# Patient Record
Sex: FEMALE | Race: BLACK OR AFRICAN AMERICAN | Employment: FULL TIME | URBAN - METROPOLITAN AREA
[De-identification: names, ages, dates, MRNs, and addresses within clinical notes are randomized per-mention and may not be internally consistent; named-entity substitution may affect disease eponyms.]

---

## 2017-01-05 ENCOUNTER — HOSPITAL ENCOUNTER (OUTPATIENT)
Dept: GENERAL RADIOLOGY | Age: 52
Discharge: HOME | End: 2017-01-05
Payer: COMMERCIAL

## 2017-01-05 DIAGNOSIS — M25.551 RIGHT HIP PAIN: ICD-10-CM

## 2017-01-05 DIAGNOSIS — M25.512 ACUTE PAIN OF LEFT SHOULDER: ICD-10-CM

## 2017-01-05 PROCEDURE — 73030 X-RAY EXAM OF SHOULDER: CPT

## 2017-01-05 PROCEDURE — 73502 X-RAY EXAM HIP UNI 2-3 VIEWS: CPT

## 2017-04-13 ENCOUNTER — APPOINTMENT (OUTPATIENT)
Dept: GENERAL RADIOLOGY | Age: 52
End: 2017-04-13
Attending: EMERGENCY MEDICINE
Payer: COMMERCIAL

## 2017-04-13 ENCOUNTER — HOSPITAL ENCOUNTER (EMERGENCY)
Age: 52
Discharge: HOME | End: 2017-04-13
Attending: EMERGENCY MEDICINE
Payer: COMMERCIAL

## 2017-04-13 VITALS
RESPIRATION RATE: 18 BRPM | DIASTOLIC BLOOD PRESSURE: 76 MMHG | HEIGHT: 65 IN | OXYGEN SATURATION: 99 % | SYSTOLIC BLOOD PRESSURE: 130 MMHG | WEIGHT: 200 LBS | HEART RATE: 74 BPM | TEMPERATURE: 98.2 F | BODY MASS INDEX: 33.32 KG/M2

## 2017-04-13 DIAGNOSIS — S66.911A WRIST STRAIN, RIGHT, INITIAL ENCOUNTER: Primary | ICD-10-CM

## 2017-04-13 DIAGNOSIS — S00.81XA EXCORIATION OF FACE, INITIAL ENCOUNTER: ICD-10-CM

## 2017-04-13 DIAGNOSIS — T74.91XA DOMESTIC VIOLENCE OF ADULT, INITIAL ENCOUNTER: ICD-10-CM

## 2017-04-13 DIAGNOSIS — S00.83XA FACIAL CONTUSION, INITIAL ENCOUNTER: ICD-10-CM

## 2017-04-13 PROCEDURE — 99283 EMERGENCY DEPT VISIT LOW MDM: CPT | Performed by: EMERGENCY MEDICINE

## 2017-04-13 PROCEDURE — L3908 WHO COCK-UP NONMOLDE PRE OTS: HCPCS

## 2017-04-13 PROCEDURE — 75810000053 HC SPLINT APPLICATION: Performed by: EMERGENCY MEDICINE

## 2017-04-13 PROCEDURE — 73110 X-RAY EXAM OF WRIST: CPT

## 2017-04-13 PROCEDURE — 74011250637 HC RX REV CODE- 250/637: Performed by: EMERGENCY MEDICINE

## 2017-04-13 RX ORDER — CEFAZOLIN SODIUM 1 G/3ML
1 INJECTION, POWDER, FOR SOLUTION INTRAMUSCULAR; INTRAVENOUS
Status: DISCONTINUED | OUTPATIENT
Start: 2017-04-13 | End: 2017-04-13

## 2017-04-13 RX ORDER — IBUPROFEN 800 MG/1
800 TABLET ORAL
Status: COMPLETED | OUTPATIENT
Start: 2017-04-13 | End: 2017-04-13

## 2017-04-13 RX ADMIN — IBUPROFEN 800 MG: 800 TABLET ORAL at 21:18

## 2017-04-14 NOTE — ED NOTES
I have reviewed discharge instructions with the patient. The patient verbalized understanding. Pt left ambulatory
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
## 2017-04-14 NOTE — DISCHARGE INSTRUCTIONS
Continue with ibuprofen and Ultram as usual.  WRIST splint for comfort, may remove during bath/shower time,       Wrist Sprain: Care Instructions  Your Care Instructions    Your wrist hurts because you have stretched or torn ligaments, which connect the bones in your wrist.  Wrist sprains usually take from 2 to 10 weeks to heal, but some take longer. Usually, the more pain you have, the more severe your wrist sprain is and the longer it will take to heal. You can heal faster and regain strength in your wrist with good home treatment. Follow-up care is a key part of your treatment and safety. Be sure to make and go to all appointments, and call your doctor if you are having problems. It's also a good idea to know your test results and keep a list of the medicines you take. How can you care for yourself at home? · Prop up your arm on a pillow when you ice it or anytime you sit or lie down for the next 3 days. Try to keep your wrist above the level of your heart. This will help reduce swelling. · Put ice or cold packs on your wrist for 10 to 20 minutes at a time. Try to do this every 1 to 2 hours for the next 3 days (when you are awake) or until the swelling goes down. Put a thin cloth between the ice pack and your skin. · After 2 or 3 days, if your swelling is gone, apply a heating pad set on low or a warm cloth to your wrist. This helps keep your wrist flexible. Some doctors suggest that you go back and forth between hot and cold. · If you have an elastic bandage, keep it on for the next 24 to 36 hours. The bandage should be snug but not so tight that it causes numbness or tingling. To rewrap the wrist, wrap the bandage around the hand a few times, beginning at the fingers. Then wrap it around the hand between the thumb and index finger, ending by circling the wrist several times. · If your doctor gave you a splint or brace, wear it as directed to protect your wrist until it has healed.   · Take pain medicines exactly as directed. ¨ If the doctor gave you a prescription medicine for pain, take it as prescribed. ¨ If you are not taking a prescription pain medicine, ask your doctor if you can take an over-the-counter medicine. · Try not to use your injured wrist and hand. When should you call for help? Call your doctor now or seek immediate medical care if:  · Your hand or fingers are cool or pale or change color. Watch closely for changes in your health, and be sure to contact your doctor if:  · Your pain gets worse. · Your wrist has not improved after 1 week. Where can you learn more? Go to http://nicholasMang?rKarttaylor.info/. Enter G541 in the search box to learn more about \"Wrist Sprain: Care Instructions. \"  Current as of: May 23, 2016  Content Version: 11.2  © 7038-0087 [employer]. Care instructions adapted under license by [employer] (which disclaims liability or warranty for this information). If you have questions about a medical condition or this instruction, always ask your healthcare professional. [place] [address] any warranty or liability for your use of this information. Learning About RICE (Rest, Ice, Compression, and Elevation)  What is RICE? RICE is a way to care for an injury. RICE helps relieve pain and swelling. It may also help with healing and flexibility. RICE stands for:  · Rest and protect the injured or sore area. · Ice or a cold pack used as soon as possible. · Compression, or wrapping the injured or sore area with an elastic bandage. · Elevation (propping up) the injured or sore area. How do you do RICE? You can use RICE for home treatment when you have general aches and pains or after an injury or surgery. Rest  · Do not put weight on the injury for at least 24 to 48 hours. · Use crutches for a badly sprained knee or ankle. · Support a sprained wrist, elbow, or shoulder with a sling.   Ice  · Put ice or a cold pack on the injury right away to reduce pain and swelling. Frozen vegetables will also work as an ice pack. Put a thin cloth between the ice or cold pack and your skin. The cloth protects the injured area from getting too cold. · Use ice for 10 to 15 minutes at a time for the first 48 to 72 hours. Compression  · Use compression for sprains, strains, and surgeries of the arms and legs. · Wrap the injured area with an elastic bandage or compression sleeve to reduce swelling. · Don't wrap it too tightly. If the area below it feels numb, tingles, or feels cool, loosen the wrap. Elevation  · Use elevation for areas of the body that can be propped up, such as arms and legs. · Prop up the injured area on pillows whenever you use ice. Keep it propped up anytime you sit or lie down. · Try to keep the injured area at or above the level of your heart. This will help reduce swelling and bruising. Where can you learn more? Go to http://nicholas-taylor.info/. Enter W468 in the search box to learn more about \"Learning About RICE (Rest, Ice, Compression, and Elevation). \"  Current as of: May 23, 2016  Content Version: 11.2  © 6557-5699 [employer]. Care instructions adapted under license by [employer] (which disclaims liability or warranty for this information). If you have questions about a medical condition or this instruction, always ask your healthcare professional. [place] [address] any warranty or liability for your use of this information.

## 2017-05-09 ENCOUNTER — HOSPITAL ENCOUNTER (OUTPATIENT)
Dept: MRI IMAGING | Age: 52
Discharge: HOME | End: 2017-05-09
Attending: ORTHOPAEDIC SURGERY
Payer: COMMERCIAL

## 2017-05-09 DIAGNOSIS — M25.531 PAIN IN RIGHT WRIST: ICD-10-CM

## 2017-05-09 PROCEDURE — 73221 MRI JOINT UPR EXTREM W/O DYE: CPT

## 2017-06-05 ENCOUNTER — HOSPITAL ENCOUNTER (OUTPATIENT)
Dept: PHYSICAL THERAPY | Age: 52
Discharge: HOME | End: 2017-06-05
Payer: COMMERCIAL

## 2017-06-05 DIAGNOSIS — M25.551 PAIN OF RIGHT HIP JOINT: ICD-10-CM

## 2017-06-05 PROCEDURE — 97165 OT EVAL LOW COMPLEX 30 MIN: CPT
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
## 2017-06-08 ENCOUNTER — HOSPITAL ENCOUNTER (OUTPATIENT)
Dept: PHYSICAL THERAPY | Age: 52
Discharge: HOME | End: 2017-06-08
Payer: COMMERCIAL

## 2017-06-08 PROCEDURE — 97110 THERAPEUTIC EXERCISES: CPT
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
## 2017-06-09 ENCOUNTER — HOSPITAL ENCOUNTER (OUTPATIENT)
Dept: PHYSICAL THERAPY | Age: 52
Discharge: HOME | End: 2017-06-09
Payer: COMMERCIAL

## 2017-06-09 PROCEDURE — 97110 THERAPEUTIC EXERCISES: CPT
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
## 2017-06-12 ENCOUNTER — HOSPITAL ENCOUNTER (OUTPATIENT)
Dept: PHYSICAL THERAPY | Age: 52
Discharge: HOME | End: 2017-06-12
Payer: COMMERCIAL

## 2017-06-12 PROCEDURE — 97110 THERAPEUTIC EXERCISES: CPT
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
## 2017-06-14 ENCOUNTER — HOSPITAL ENCOUNTER (OUTPATIENT)
Dept: PHYSICAL THERAPY | Age: 52
Discharge: HOME | End: 2017-06-14
Payer: COMMERCIAL

## 2017-06-14 PROCEDURE — 97110 THERAPEUTIC EXERCISES: CPT
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
## 2017-06-16 ENCOUNTER — HOSPITAL ENCOUNTER (OUTPATIENT)
Dept: PHYSICAL THERAPY | Age: 52
Discharge: HOME | End: 2017-06-16
Payer: COMMERCIAL

## 2017-06-16 PROCEDURE — 97018 PARAFFIN BATH THERAPY: CPT

## 2017-06-16 PROCEDURE — 97110 THERAPEUTIC EXERCISES: CPT
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
## 2017-06-19 ENCOUNTER — HOSPITAL ENCOUNTER (OUTPATIENT)
Dept: PHYSICAL THERAPY | Age: 52
Discharge: HOME | End: 2017-06-19
Payer: COMMERCIAL

## 2017-06-19 PROCEDURE — 97110 THERAPEUTIC EXERCISES: CPT
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
## 2017-06-23 ENCOUNTER — HOSPITAL ENCOUNTER (OUTPATIENT)
Dept: PHYSICAL THERAPY | Age: 52
Discharge: HOME | End: 2017-06-23
Payer: COMMERCIAL

## 2017-06-23 PROCEDURE — 97110 THERAPEUTIC EXERCISES: CPT
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
## 2017-06-26 ENCOUNTER — HOSPITAL ENCOUNTER (OUTPATIENT)
Dept: PHYSICAL THERAPY | Age: 52
Discharge: HOME | End: 2017-06-26
Payer: COMMERCIAL

## 2017-06-26 PROCEDURE — 97110 THERAPEUTIC EXERCISES: CPT
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
## 2017-06-28 ENCOUNTER — HOSPITAL ENCOUNTER (OUTPATIENT)
Dept: PHYSICAL THERAPY | Age: 52
Discharge: HOME | End: 2017-06-28
Payer: COMMERCIAL

## 2017-06-28 PROCEDURE — 97110 THERAPEUTIC EXERCISES: CPT
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
## 2017-06-30 ENCOUNTER — HOSPITAL ENCOUNTER (OUTPATIENT)
Dept: PHYSICAL THERAPY | Age: 52
Discharge: HOME | End: 2017-06-30
Payer: COMMERCIAL

## 2017-06-30 PROCEDURE — 97110 THERAPEUTIC EXERCISES: CPT
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
## 2017-07-03 ENCOUNTER — HOSPITAL ENCOUNTER (OUTPATIENT)
Dept: PHYSICAL THERAPY | Age: 52
Discharge: HOME | End: 2017-07-03
Payer: COMMERCIAL

## 2017-07-03 PROCEDURE — 97018 PARAFFIN BATH THERAPY: CPT

## 2017-07-03 PROCEDURE — 97110 THERAPEUTIC EXERCISES: CPT
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
## 2017-07-05 ENCOUNTER — HOSPITAL ENCOUNTER (OUTPATIENT)
Dept: PHYSICAL THERAPY | Age: 52
Discharge: HOME | End: 2017-07-05
Payer: COMMERCIAL

## 2017-07-05 PROCEDURE — 97110 THERAPEUTIC EXERCISES: CPT

## 2017-07-05 PROCEDURE — 97018 PARAFFIN BATH THERAPY: CPT

## 2017-07-05 PROCEDURE — 97140 MANUAL THERAPY 1/> REGIONS: CPT
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
## 2017-07-07 ENCOUNTER — HOSPITAL ENCOUNTER (OUTPATIENT)
Dept: PHYSICAL THERAPY | Age: 52
Discharge: HOME | End: 2017-07-07
Payer: COMMERCIAL

## 2017-07-07 PROCEDURE — 97110 THERAPEUTIC EXERCISES: CPT

## 2017-07-07 PROCEDURE — 97140 MANUAL THERAPY 1/> REGIONS: CPT
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
## 2017-08-18 PROBLEM — K22.2 ESOPHAGEAL STRICTURE: Status: ACTIVE | Noted: 2017-08-18

## 2017-08-18 PROBLEM — K62.5 RECTAL BLEEDING: Status: ACTIVE | Noted: 2017-08-18

## 2017-09-05 ENCOUNTER — HOSPITAL ENCOUNTER (OUTPATIENT)
Age: 52
Setting detail: OUTPATIENT SURGERY
Discharge: HOME | End: 2017-09-05
Attending: SURGERY | Admitting: SURGERY
Payer: COMMERCIAL

## 2017-09-05 ENCOUNTER — PATIENT OUTREACH (OUTPATIENT)
Dept: OTHER | Age: 52
End: 2017-09-05

## 2017-09-05 VITALS
HEART RATE: 78 BPM | RESPIRATION RATE: 18 BRPM | SYSTOLIC BLOOD PRESSURE: 143 MMHG | OXYGEN SATURATION: 93 % | WEIGHT: 222 LBS | HEIGHT: 65 IN | DIASTOLIC BLOOD PRESSURE: 79 MMHG | BODY MASS INDEX: 36.99 KG/M2 | TEMPERATURE: 98.4 F

## 2017-09-05 PROCEDURE — 74011250636 HC RX REV CODE- 250/636

## 2017-09-05 PROCEDURE — C1726 CATH, BAL DIL, NON-VASCULAR: HCPCS | Performed by: SURGERY

## 2017-09-05 PROCEDURE — 77030009426 HC FCPS BIOP ENDOSC BSC -B: Performed by: SURGERY

## 2017-09-05 PROCEDURE — 99153 MOD SED SAME PHYS/QHP EA: CPT | Performed by: SURGERY

## 2017-09-05 PROCEDURE — 88305 TISSUE EXAM BY PATHOLOGIST: CPT | Performed by: SURGERY

## 2017-09-05 PROCEDURE — 88312 SPECIAL STAINS GROUP 1: CPT | Performed by: SURGERY

## 2017-09-05 PROCEDURE — G0500 MOD SEDAT ENDO SERVICE >5YRS: HCPCS | Performed by: SURGERY

## 2017-09-05 PROCEDURE — 76040000026: Performed by: SURGERY

## 2017-09-05 PROCEDURE — 74011250636 HC RX REV CODE- 250/636: Performed by: ANESTHESIOLOGY

## 2017-09-05 RX ORDER — FENTANYL CITRATE 50 UG/ML
100 INJECTION, SOLUTION INTRAMUSCULAR; INTRAVENOUS
Status: DISCONTINUED | OUTPATIENT
Start: 2017-09-05 | End: 2017-09-05 | Stop reason: HOSPADM

## 2017-09-05 RX ORDER — SODIUM CHLORIDE, SODIUM LACTATE, POTASSIUM CHLORIDE, CALCIUM CHLORIDE 600; 310; 30; 20 MG/100ML; MG/100ML; MG/100ML; MG/100ML
1000 INJECTION, SOLUTION INTRAVENOUS CONTINUOUS
Status: DISCONTINUED | OUTPATIENT
Start: 2017-09-05 | End: 2017-09-05 | Stop reason: HOSPADM

## 2017-09-05 RX ORDER — SODIUM CHLORIDE 0.9 % (FLUSH) 0.9 %
5-10 SYRINGE (ML) INJECTION AS NEEDED
Status: DISCONTINUED | OUTPATIENT
Start: 2017-09-05 | End: 2017-09-05 | Stop reason: HOSPADM

## 2017-09-05 RX ORDER — MIDAZOLAM HYDROCHLORIDE 1 MG/ML
5 INJECTION, SOLUTION INTRAMUSCULAR; INTRAVENOUS
Status: DISCONTINUED | OUTPATIENT
Start: 2017-09-05 | End: 2017-09-05 | Stop reason: HOSPADM

## 2017-09-05 RX ORDER — SODIUM CHLORIDE 9 MG/ML
25 INJECTION, SOLUTION INTRAVENOUS CONTINUOUS
Status: DISCONTINUED | OUTPATIENT
Start: 2017-09-05 | End: 2017-09-05 | Stop reason: HOSPADM

## 2017-09-05 RX ORDER — SODIUM CHLORIDE 0.9 % (FLUSH) 0.9 %
5-10 SYRINGE (ML) INJECTION EVERY 8 HOURS
Status: DISCONTINUED | OUTPATIENT
Start: 2017-09-05 | End: 2017-09-05 | Stop reason: HOSPADM

## 2017-09-05 RX ADMIN — FENTANYL CITRATE 50 MCG: 50 INJECTION, SOLUTION INTRAMUSCULAR; INTRAVENOUS at 09:56

## 2017-09-05 RX ADMIN — FENTANYL CITRATE 50 MCG: 50 INJECTION, SOLUTION INTRAMUSCULAR; INTRAVENOUS at 09:37

## 2017-09-05 RX ADMIN — MIDAZOLAM HYDROCHLORIDE 1 MG: 1 INJECTION, SOLUTION INTRAMUSCULAR; INTRAVENOUS at 09:47

## 2017-09-05 RX ADMIN — FENTANYL CITRATE 50 MCG: 50 INJECTION, SOLUTION INTRAMUSCULAR; INTRAVENOUS at 09:30

## 2017-09-05 RX ADMIN — MIDAZOLAM HYDROCHLORIDE 1 MG: 1 INJECTION, SOLUTION INTRAMUSCULAR; INTRAVENOUS at 09:25

## 2017-09-05 RX ADMIN — MIDAZOLAM HYDROCHLORIDE 2 MG: 1 INJECTION, SOLUTION INTRAMUSCULAR; INTRAVENOUS at 09:23

## 2017-09-05 RX ADMIN — FENTANYL CITRATE 50 MCG: 50 INJECTION, SOLUTION INTRAMUSCULAR; INTRAVENOUS at 09:48

## 2017-09-05 RX ADMIN — MIDAZOLAM HYDROCHLORIDE 2 MG: 1 INJECTION, SOLUTION INTRAMUSCULAR; INTRAVENOUS at 09:55

## 2017-09-05 RX ADMIN — MIDAZOLAM HYDROCHLORIDE 2 MG: 1 INJECTION, SOLUTION INTRAMUSCULAR; INTRAVENOUS at 09:29

## 2017-09-05 RX ADMIN — FENTANYL CITRATE 50 MCG: 50 INJECTION, SOLUTION INTRAMUSCULAR; INTRAVENOUS at 09:26

## 2017-09-05 RX ADMIN — FENTANYL CITRATE 50 MCG: 50 INJECTION, SOLUTION INTRAMUSCULAR; INTRAVENOUS at 09:23

## 2017-09-05 RX ADMIN — SODIUM CHLORIDE, SODIUM LACTATE, POTASSIUM CHLORIDE, AND CALCIUM CHLORIDE 1000 ML: 600; 310; 30; 20 INJECTION, SOLUTION INTRAVENOUS at 08:29

## 2017-09-05 NOTE — PROCEDURES
[address]   PROCEDURE NOTE       Name:  India Serrato   MR#:  [id number]   :  1965   Account #:  [de-identified]   Date of Adm:  2017       SURGEON: Eusebio Hardy MD    PREOPERATIVE DIAGNOSES   1. Lower gastrointestinal bleeding with history of   diverticulosis. 2. History of dysphagia with esophageal dilatation. POSTOPERATIVE DIAGNOSES   1. Sigmoid diverticulosis with mild hemorrhoids. No evidence of   active bleeding. 2. Distal stomach submucosal lesion. 3. Proximal stomach diffuse polyps. 4. Hiatal hernia, about 5 cm in size, with evidence of   esophageal stricture. PROCEDURE   1. Esophagogastroduodenoscopy with stomach polyp biopsy. 2. Esophagogastroduodenoscopy with esophageal dilatation with   balloon. 3. Colonoscopy. INDICATIONS: This is a 50-year-old female who presented with an   episode of lower GI bleeding. She needed a colonoscopy. She has   a history of a colonoscopy many years ago, and she was told she   had diverticulosis. She also has history of dysphagia. Dysphagia   is coming back and she wants to have an EGD at the same time. The patient understood the risks and benefits, agreed to   proceed. FINDINGS: Her EGD showed duodenitis at the first portion and   then the distal stomach has a polypoid lesion, ,   it looks like a submucosal lesion with umbilicus. I had Dr. David Flores   in the room and he agreed this is probably a benign lesion, but   I cannot rule out neuroendocrine tumor, EUS may be worthwhile to   proceed. Then the scope was further withdrawn in the proximal   stomach and some diffuse polypoid lesions were seen. It looks   like benign lesions, probably consequences of long-term acid   Reflux, the biopsy was performed. Then scope was further   withdrawn. There was, in the lower esophagus, a hiatal hernia,   about 5 cm long was visualized. The diaphragm insertion was   about at 38 cm,  the   Z-line is at about 33 cm from the incisor. Then I passed the   balloon and the lower esophagus GE junction was dilated to 20 mm   4 times. After the balloon removed, I saw some mucosal bleeding   at the lower GE junction. Otherwise, I did not see any   laceration. Then the stomach was emptied and the scope was   withdrawn. Then we turned the patient into position for colonoscopy. Digital rectal exam first was performed, which was normal. Then   the regular colonoscope was used to advance under direct vision   all the way into the cecum. The appendiceal orifice was clearly   identified. Then the scope was withdrawn. The ascending,   transverse, descending colon looked all normal. There was at   least a moderate amount of diverticulosis in the sigmoid colon. Then the scope was withdrawn into the rectum. The J maneuver was   performed. Mild hemorrhoids were identified. Then the scope was   withdrawn. The patient tolerated the procedure well, transferred   to recovery room in stable condition. RECOMMENDATION: The patient will see me in 2-3 weeks to discuss   the findings and the biopsy results from her EGD.         Juwan Maya MD      BY / JESSICA   D:  09/05/2017   10:19   T:  09/05/2017   11:09   Job #:  [id number]

## 2017-09-05 NOTE — H&P
[facility]  [facility]. [address] [place] [address], [address] [place] [address]  [phone]      Patient:  Cary Peralta  : 1965     HPI  Cary Peralta is a 47 y.o. female who is seen for colonoscopy request. She developed rectal bleeding a month ago. It's dark red, mainly on tissue, she denies constipation, her stool color is brown. She is known to have hemorrhoid, she denies rectal pain, but has some burning sensation in the anal area. Her last colonoscopy was 20 years ago due to rectal bleeding, and she was told to have diverticular disease. She also has history of EGD dilation for dysphagia at least twice. The last dilation was about 5 years ago, now she feels the dysphagia is coming back, she eats very carefully. She does have acid reflux for long time, and esophageal stenosis.      She has been suffering from right ear infection recently.              Past Medical History:   Diagnosis Date    Allergic rhinitis due to pollen 13    Asymptomatic varicose veins 13    Congenital tracheoesophageal fistula, esophageal atresia and stenosis 13    Cough 14    Disorder of bone and cartilage, unspecified 14    Disruptions of 24 hour sleep wake cycle, unspecified 14    Duodenitis      Ear problems      Femoral hernia without mention of obstruction or gangrene, recurrent bilateral 13    Generalized hyperhidrosis 13    GERD (gastroesophageal reflux disease) 14    H/O hiatal hernia      H/O seasonal allergies      History of gastric ulcer      History of osteoporosis      Low bone density for age      Other and unspecified hyperlipidemia 13    Vomiting alone 13             Current Outpatient Prescriptions   Medication Sig Dispense Refill    hydrocortisone (ANUSOL-HC) 2.5 % rectal cream Insert  into rectum four (4) times daily. 30 g 0    pantoprazole (PROTONIX) 40 mg tablet Take 1 Tab by mouth daily.  90 Tab 4    fluticasone (FLONASE) 50 mcg/actuation nasal spray          traMADol (ULTRAM) 50 mg tablet Take 1 Tab by mouth every six (6) hours as needed for Pain. Max Daily Amount: 200 mg. Indications: Pain 120 Tab 5    montelukast (SINGULAIR) 10 mg tablet Take 1 Tab by mouth daily. 90 Tab 3    desonide (TRIDESILON) 0.05 % cream Apply  to affected area two (2) times a day. (Patient taking differently: Apply  to affected area two (2) times daily as needed.) 45 g 3            Allergies   Allergen Reactions    Boniva [Ibandronate] Other (comments)       tachycardia            Past Surgical History:   Procedure Laterality Date    HX COLONOSCOPY        HX GI   1998     EGD    HX GYN   1997     tubal ligation    HX OTHER SURGICAL   1998     esopageal dilation            Family History   Problem Relation Age of Onset   [place] Arthritis-osteo Mother      Hypertension Mother      Cancer Father      Hypertension Father      Stroke Father      Breast Cancer Paternal Aunt      High Cholesterol Other             Social History   Substance Use Topics    Smoking status: Never Smoker    Smokeless tobacco: Never Used    Alcohol use No         Review of Systems   A comprehensive review of systems was negative except for that written in the HPI.       Physical Exam       Visit Vitals    /84    Pulse 84    Ht 5' 5\" (1.651 m)    Wt 224 lb (101.6 kg)    SpO2 96%    BMI 37.28 kg/m2         General:                    Alert, oriented, cooperative, awake patient in no acute distress   Skin:                                    Warm, moist with good texture   Eyes:                                   Sclera are clear, extraocular muscles intact  HENT:                                 Normocephalic; oral mucosa moist, nares patent; neck is supple; trachea midline  Respiratory:               Lungs clear to auscultation bilaterally, breathing is non-labored   Chest:                                  Symmetric throughout one respiratory excursion; no supraclavicular lymphadenopathy  CV:                                      Regular rate and rhythm, no appreciable murmurs, rubs, gallops  Abdomen:                  Soft, protuberant but non-distended; bowel sounds are normoactive   Extremities:               No cyanosis, clubbing or edema  Neurological:             No focal signs        Labs: No results found for: CMP, APTT, PTP, INR      Assessment/Plan:   Marleen Carmona is a 47 y.o. female who has signs and symptoms consistent with esophageal stricture and rectal bleeding.  Will do EGD dilation and colonoscopy at the same time.   Baron Bhakta MD

## 2017-09-05 NOTE — DISCHARGE INSTRUCTIONS
Gastrointestinal Esophagogastroduodenoscopy (EGD) - Upper Exam Discharge Instructions    1. Call Dr. Simon Karimi at [phone] for any problems or questions. 2. Contact the doctor's office for follow up appointment as directed. 3. Medication may cause drowsiness for several hours, therefore, do not drive or                  operate machinery for remainder of the day. 4. No alcohol today. 5. Ordinarily, you may resume regular diet and activity after exam unless otherwise              specified by your physician. 6. For mild soreness in your throat you may use Cepacol throat lozenges or warm               salt-water gargles as needed. Gastrointestinal Colonoscopy/Flexible Sigmoidoscopy - Lower Exam Discharge Instructions  1. Call Dr. Simon Karimi at [phone] for any problems or questions. 2. Contact the doctors office for follow up appointment as directed  3. Medication may cause drowsiness for several hours, therefore, do not drive or operate machinery for remainder of the day. 4. No alcohol today. 5. Ordinarily, you may resume regular diet and activity after exam unless otherwise specified by your physician. 6. Because of air put into your colon during exam, you may experience some abdominal distension, relieved by the passage of gas, for several hours. 7. Contact your physician if you have any of the following:  a. Excessive amount of bleeding - large amount when having a bowel movement. Occasional specks of blood with bowel movement would not be unusual.  b. Severe abdominal pain  c. Fever or Chills    Any additional instructions:  Call to schedule an appointment with Dr. Simon Karimi for 2-3 weeks. Instructions given to Jennifer Jonas and jazlyn.   Instructions given by:  Segundo Chase RN

## 2017-09-05 NOTE — PROGRESS NOTES
Patient passing flatus, tolerating po fluids well. Patient escorted to car via wheelchair  by Eliane Castellanos  for discharge home with boyfriend. Discharge instructions reviewed. Dr. Danielle Merrill spoke with pt and her son.

## 2017-09-05 NOTE — IP AVS SNAPSHOT
[facility] 
 
 
 [address] 
[phone] Patient: Clau Celeste MRN: [id number] :1965 You are allergic to the following Allergen Reactions Boniva (Ibandronate) Other (comments)  
 tachycardia Recent Documentation Height Weight BMI OB Status Smoking Status 1.651 m 100.7 kg 36.94 kg/m2 Menopause Never Smoker Emergency Contacts Name Discharge Info Relation Home Work Mobile Joslyn Arias  Parent [1] [phone] [facility] DISCHARGE CAREGIVER [3] Son [22] [phone] About your hospitalization You were admitted on:  2017 You last received care in the:  [facility] You were discharged on:  2017 Unit phone number:  [phone] Why you were hospitalized Your primary diagnosis was:  Not on File Providers Seen During Your Hospitalizations Provider Role Specialty Primary office phone Jean-Pierre Hou MD Attending Provider General Surgery [phone] Your Primary Care Physician (PCP) Primary Care Physician Office Phone Office Fax [address] [facility] [phone] Follow-up Information None Your Appointments 2017  9:45 AM EDT Office Visit with Celestina Haider, [facility] [address] [facility] ENT) [address] [place] [address]  
[phone] Current Discharge Medication List  
  
ASK your doctor about these medications Dose & Instructions Dispensing Information Comments Morning Noon Evening Bedtime  
 desonide 0.05 % cream  
Commonly known as:  Obi Tariq Your last dose was: Your next dose is:    
   
   
 Apply  to affected area two (2) times a day. Quantity:  45 g Refills:  3  
     
   
   
   
  
 fluticasone 50 mcg/actuation nasal spray Commonly known as:  Leafy Few Your last dose was: Your next dose is:    
   
   
  Refills:  0  
     
   
   
   
  
 hydrocortisone 2.5 % rectal cream  
Commonly known as:  ANUSOL-HC Your last dose was: Your next dose is: Insert  into rectum four (4) times daily. Quantity:  30 g Refills:  0  
     
   
   
   
  
 montelukast 10 mg tablet Commonly known as:  SINGULAIR Your last dose was: Your next dose is:    
   
   
 Dose:  10 mg Take 1 Tab by mouth daily. Quantity:  90 Tab Refills:  3  
     
   
   
   
  
 pantoprazole 40 mg tablet Commonly known as:  PROTONIX Your last dose was: Your next dose is:    
   
   
 Dose:  40 mg Take 1 Tab by mouth daily. Quantity:  90 Tab Refills:  4  
     
   
   
   
  
 traMADol 50 mg tablet Commonly known as:  ULTRAM  
   
Your last dose was: Your next dose is:    
   
   
 Dose:  50 mg Take 1 Tab by mouth every six (6) hours as needed for Pain. Max Daily Amount: 200 mg. Indications: Pain Quantity:  120 Tab Refills:  5 Discharge Instructions Gastrointestinal Esophagogastroduodenoscopy (EGD) - Upper Exam Discharge Instructions 1. Call Dr. Steve Yang at [phone] for any problems or questions. 2. Contact the doctor's office for follow up appointment as directed. 3. Medication may cause drowsiness for several hours, therefore, do not drive or                  operate machinery for remainder of the day. 4. No alcohol today. 5. Ordinarily, you may resume regular diet and activity after exam unless otherwise              specified by your physician. 6. For mild soreness in your throat you may use Cepacol throat lozenges or warm               salt-water gargles as needed. Gastrointestinal Colonoscopy/Flexible Sigmoidoscopy - Lower Exam Discharge Instructions 1. Call Dr. Steve Yang at [phone] for any problems or questions. 2. Contact the doctors office for follow up appointment as directed 3. Medication may cause drowsiness for several hours, therefore, do not drive or operate machinery for remainder of the day. 4. No alcohol today. 5. Ordinarily, you may resume regular diet and activity after exam unless otherwise specified by your physician. 6. Because of air put into your colon during exam, you may experience some abdominal distension, relieved by the passage of gas, for several hours. 7. Contact your physician if you have any of the following: 
a. Excessive amount of bleeding  large amount when having a bowel movement. Occasional specks of blood with bowel movement would not be unusual. 
b. Severe abdominal pain 
c. Fever or Chills Any additional instructions:  Call to schedule an appointment with Dr. Zenaida Lara for 2-3 weeks. Instructions given to Zaira Frankel and son. Instructions given by:  Adalgisa James RN Discharge Orders None Introducing [facility]! Davon Mcpherson introduces [employer] patient portal. Now you can access parts of your medical record, email your doctor's office, and request medication refills online. 1. In your internet browser, go to [email or web address] 2. Click on the First Time User? Click Here link in the Sign In box. You will see the New Member Sign Up page. 3. Enter your [employer] Access Code exactly as it appears below. You will not need to use this code after youve completed the sign-up process. If you do not sign up before the expiration date, you must request a new code. · [employer] Access Code: [email or web address] Expires: 10/11/2017  2:55 PM 
 
4. Enter the last four digits of your Social Security Number (xxxx) and Date of Birth (mm/dd/yyyy) as indicated and click Submit. You will be taken to the next sign-up page. 5. Create a [employer] ID.  This will be your [employer] login ID and cannot be changed, so think of one that is secure and easy to remember. 6. Create a [employer] password. You can change your password at any time. 7. Enter your Password Reset Question and Answer. This can be used at a later time if you forget your password. 8. Enter your e-mail address. You will receive e-mail notification when new information is available in [address] 9. Click Sign Up. You can now view and download portions of your medical record. 10. Click the Download Summary menu link to download a portable copy of your medical information. If you have questions, please visit the Frequently Asked Questions section of the [employer] website. Remember, [employer] is NOT to be used for urgent needs. For medical emergencies, dial 911. Now available from your iPhone and Android! General Information Please provide this summary of care documentation to your next provider. Patient Signature:  ____________________________________________________________ Date:  ____________________________________________________________  
  
Radha De Leon Provider Signature:  ____________________________________________________________ Date:  ____________________________________________________________

## 2017-09-06 ENCOUNTER — PATIENT OUTREACH (OUTPATIENT)
Dept: OTHER | Age: 52
End: 2017-09-06

## 2017-09-06 NOTE — PROGRESS NOTES
Transition Of Care Note    Patient discharged from [address] [place] [address] following an EGD with dilitation and colonoscopy with polyp removal.       Medical History:     Past Medical History:   Diagnosis Date    Allergic rhinitis due to pollen 13    Arrhythmia     has some tachy with boniva    Asymptomatic varicose veins 13    Congenital tracheoesophageal fistula, esophageal atresia and stenosis 13    Cough 14    Disorder of bone and cartilage, unspecified 14    Disruptions of 24 hour sleep wake cycle, unspecified 14    Duodenitis     Ear problems     Femoral hernia without mention of obstruction or gangrene, recurrent bilateral 13    Generalized hyperhidrosis 13    GERD (gastroesophageal reflux disease) 14    H/O hiatal hernia     H/O seasonal allergies     History of gastric ulcer     History of osteoporosis     Low bone density for age    Henna Daniel Other and unspecified hyperlipidemia 13    Vomiting alone 13       Care Manager contacted the patient by telephone to perform post hospital discharge assessment. Verified  and zip code with patient as identifiers. Provided introduction to self, and explanation of the Nurse Care Manager role. Patient states he throat is still sore but has been taking tylenol and cepacol losenges with some relief. She denies nausea, pain, bleeding, emesis, fever, cough, or trouble swallowing. She denies problems with urination or defacation. She states she has been resting since her procedure yesterday. Medication:   Performed medication reconciliation with patient, and patient verbalizes understanding of administration of home medications. There were no barriers to obtaining medications identified at this time. Support system:  patient    Discharge Instructions :  Reviewed discharge instructions with patient. Patient verbalizes understanding of discharge instructions and follow-up care.        Red Flags:  Call your doctor now or seek immediate medical care if:  · You have trouble swallowing. · You have belly pain. · Your stools are black and tarlike or have streaks of blood. · You are sick to your stomach or cannot keep fluids down. · You have signs of infection, such as:  ¨ Increased pain, swelling, warmth, or redness around your throat, neck, or belly. ¨ A fever. Advance Care Planning:   Patient was offered the opportunity to discuss advance care planning:  no     Does patient have an Advance Directive:  no   If no, did you provide information on Caring Connections?  no     PCP/Specialist follow up: Patient scheduled to follow up with Dr.Yi Devyn Russo in 1 week. Reviewed red flags with patient, and patient verbalizes understanding. Patient given an opportunity to ask questions. No other clinical/social/functional needs noted. The patient agrees to contact the PCP office for questions related to their healthcare. The patient expressed thanks, offered no additional questions and ended the call.

## 2017-09-06 NOTE — PROGRESS NOTES
Patient outreach attempted or transitions of care for employee care management. Left discreet message on voicemail with this CM contact information. Will attempt to contact again to offer [phone] Management services.

## 2017-09-20 ENCOUNTER — PATIENT OUTREACH (OUTPATIENT)
Dept: OTHER | Age: 52
End: 2017-09-20

## 2017-09-20 NOTE — PROGRESS NOTES
Care Manager contacted the patient by telephone for DAISY follow up. Verified  and zip code with patient as identifiers. Patient states sh has been doing well since the procedure. She has no complaints. She denies nausea, pain, bleeding, emesis, fever, cough, or trouble swallowing. She denies problems with urination or defacation. She states she has returned to her normal activities since her procedure. Will FU in 2 weeks.

## 2017-10-05 ENCOUNTER — PATIENT OUTREACH (OUTPATIENT)
Dept: OTHER | Age: 52
End: 2017-10-05

## 2017-10-05 NOTE — PROGRESS NOTES
Resolving current episode of care. Transitions of care complete. No further ED/UC or hospital admissions within 30 days post discharge. Goals met with improved GERD symptoms, now on PPI. Patient attended follow-up appointments as directed. No outreach from patient to [address] [place] [address].

## 2017-12-07 ENCOUNTER — HOSPITAL ENCOUNTER (OUTPATIENT)
Dept: MAMMOGRAPHY | Age: 52
Discharge: HOME | End: 2017-12-07
Payer: COMMERCIAL

## 2017-12-07 DIAGNOSIS — Z12.31 ENCOUNTER FOR SCREENING MAMMOGRAM FOR MALIGNANT NEOPLASM OF BREAST: ICD-10-CM

## 2017-12-07 PROCEDURE — 77067 SCR MAMMO BI INCL CAD: CPT

## 2018-03-27 PROBLEM — E66.01 SEVERE OBESITY (BMI 35.0-39.9) WITH COMORBIDITY (HCC): Status: ACTIVE | Noted: 2018-03-27

## 2018-04-16 PROBLEM — N95.0 POSTMENOPAUSAL BLEEDING: Status: ACTIVE | Noted: 2018-04-16

## 2018-05-25 PROBLEM — E83.52 HYPERCALCEMIA: Status: ACTIVE | Noted: 2018-05-25

## 2018-07-06 PROBLEM — R79.89 ABNORMAL LIVER FUNCTION TESTS: Status: ACTIVE | Noted: 2018-07-06

## 2018-07-06 PROBLEM — H69.81 DYSFUNCTION OF RIGHT EUSTACHIAN TUBE: Status: ACTIVE | Noted: 2018-07-06

## 2018-07-06 PROBLEM — R21 RASH AND NONSPECIFIC SKIN ERUPTION: Status: ACTIVE | Noted: 2018-07-06

## 2018-07-06 PROBLEM — L30.8 ASTEATOTIC ECZEMA: Status: ACTIVE | Noted: 2018-07-06

## 2018-07-15 PROBLEM — K43.9 VENTRAL HERNIA WITHOUT OBSTRUCTION OR GANGRENE: Chronic | Status: ACTIVE | Noted: 2018-07-15

## 2018-10-08 PROBLEM — R73.01 FASTING HYPERGLYCEMIA: Chronic | Status: ACTIVE | Noted: 2018-10-08

## 2018-10-20 ENCOUNTER — HOSPITAL ENCOUNTER (OUTPATIENT)
Dept: MRI IMAGING | Age: 53
Discharge: HOME | End: 2018-10-20
Attending: INTERNAL MEDICINE
Payer: COMMERCIAL

## 2018-10-20 DIAGNOSIS — R79.89 ABNORMAL LIVER FUNCTION TESTS: Chronic | ICD-10-CM

## 2018-10-20 DIAGNOSIS — K76.9 LESION OF LIVER: ICD-10-CM

## 2018-10-20 PROCEDURE — 74183 MRI ABD W/O CNTR FLWD CNTR: CPT

## 2018-10-20 PROCEDURE — 74011000258 HC RX REV CODE- 258: Performed by: INTERNAL MEDICINE

## 2018-10-20 PROCEDURE — 74011250636 HC RX REV CODE- 250/636: Performed by: INTERNAL MEDICINE

## 2018-10-20 PROCEDURE — A9575 INJ GADOTERATE MEGLUMI 0.1ML: HCPCS | Performed by: INTERNAL MEDICINE

## 2018-10-20 RX ORDER — SODIUM CHLORIDE 0.9 % (FLUSH) 0.9 %
10 SYRINGE (ML) INJECTION
Status: COMPLETED | OUTPATIENT
Start: 2018-10-20 | End: 2018-10-20

## 2018-10-20 RX ORDER — GADOTERATE MEGLUMINE 376.9 MG/ML
19 INJECTION INTRAVENOUS
Status: COMPLETED | OUTPATIENT
Start: 2018-10-20 | End: 2018-10-20

## 2018-10-20 RX ADMIN — Medication 10 ML: at 10:57

## 2018-10-20 RX ADMIN — SODIUM CHLORIDE 100 ML: 900 INJECTION, SOLUTION INTRAVENOUS at 10:57

## 2018-10-20 RX ADMIN — GADOTERATE MEGLUMINE 19 ML: 376.9 INJECTION INTRAVENOUS at 10:57

## 2018-12-13 ENCOUNTER — HOSPITAL ENCOUNTER (OUTPATIENT)
Dept: MAMMOGRAPHY | Age: 53
Discharge: HOME | End: 2018-12-13
Attending: INTERNAL MEDICINE
Payer: COMMERCIAL

## 2018-12-13 DIAGNOSIS — Z12.31 VISIT FOR SCREENING MAMMOGRAM: ICD-10-CM

## 2018-12-13 PROCEDURE — 77067 SCR MAMMO BI INCL CAD: CPT

## 2019-03-13 PROBLEM — R29.818 SUSPECTED SLEEP APNEA: Status: ACTIVE | Noted: 2019-03-13

## 2019-03-13 PROBLEM — E66.9 OBESITY (BMI 30-39.9): Status: ACTIVE | Noted: 2019-03-13

## 2019-06-08 PROBLEM — E66.9 OBESITY, CLASS II, BMI 35-39.9: Status: ACTIVE | Noted: 2019-06-08

## (undated) DEVICE — KENDALL RADIOLUCENT FOAM MONITORING ELECTRODE RECTANGULAR SHAPE: Brand: KENDALL

## (undated) DEVICE — SYR 3ML LL TIP 1/10ML GRAD --

## (undated) DEVICE — NDL PRT INJ NSAF BLNT 18GX1.5 --

## (undated) DEVICE — BLOCK BITE AD 60FR W/ VELC STRP ADDRESSES MOST PT AND

## (undated) DEVICE — DILATOR ENDOSCP L180CM DIA6FR BLLN L8CM DIA54-60FR

## (undated) DEVICE — CONTAINER PREFIL FRMLN 40ML --

## (undated) DEVICE — SYR 5ML 1/5 GRAD LL NSAF LF --

## (undated) DEVICE — CANNULA NSL ORAL AD FOR CAPNOFLEX CO2 O2 AIRLFE

## (undated) DEVICE — CONNECTOR TBNG OD5-7MM O2 END DISP

## (undated) DEVICE — FORCEPS BX L240CM JAW DIA2.8MM L CAP W/ NDL MIC MESH TOOTH